# Patient Record
Sex: MALE | Race: BLACK OR AFRICAN AMERICAN | NOT HISPANIC OR LATINO | Employment: UNEMPLOYED | URBAN - METROPOLITAN AREA
[De-identification: names, ages, dates, MRNs, and addresses within clinical notes are randomized per-mention and may not be internally consistent; named-entity substitution may affect disease eponyms.]

---

## 2023-07-15 ENCOUNTER — HOSPITAL ENCOUNTER (EMERGENCY)
Facility: HOSPITAL | Age: 28
Discharge: HOME | End: 2023-07-15
Attending: EMERGENCY MEDICINE

## 2023-07-15 VITALS
WEIGHT: 160 LBS | HEART RATE: 110 BPM | RESPIRATION RATE: 18 BRPM | SYSTOLIC BLOOD PRESSURE: 144 MMHG | DIASTOLIC BLOOD PRESSURE: 91 MMHG | OXYGEN SATURATION: 100 % | BODY MASS INDEX: 24.25 KG/M2 | HEIGHT: 68 IN | TEMPERATURE: 98 F

## 2023-07-15 DIAGNOSIS — Z20.2 POSSIBLE EXPOSURE TO STD: Primary | ICD-10-CM

## 2023-07-15 DIAGNOSIS — A74.9 CHLAMYDIA: ICD-10-CM

## 2023-07-15 LAB
APPEARANCE UR: CLEAR
BACTERIA #/AREA URNS AUTO: ABNORMAL /HPF
BILIRUB UR QL STRIP.AUTO: NEGATIVE
C TRACH DNA SPEC QL NAA+PROBE: DETECTED
COLOR UR: ABNORMAL
GLUCOSE UR QL STRIP.AUTO: NEGATIVE
HIV 1+2 AB+HIV1 P24 AG SERPL QL IA: NONREACTIVE
KETONES UR QL STRIP.AUTO: ABNORMAL
LEUKOCYTE ESTERASE UR QL STRIP.AUTO: ABNORMAL
N GONORRHOEA DNA SPEC QL NAA+PROBE: NOT DETECTED
NITRITE UR QL STRIP.AUTO: NEGATIVE
PH UR STRIP.AUTO: 6.5 [PH]
PROT UR QL STRIP.AUTO: ABNORMAL
RBC #/AREA URNS AUTO: 5 /HPF
RBC UR QL AUTO: ABNORMAL
SP GR UR STRIP.AUTO: 1.02 (ref 1–1.03)
SQUAMOUS #/AREA URNS AUTO: <5 /HPF
UROBILINOGEN UR STRIP-ACNC: 1
WBC #/AREA URNS AUTO: 137 /HPF

## 2023-07-15 PROCEDURE — 63600175 PHARM REV CODE 636 W HCPCS: Performed by: NURSE PRACTITIONER

## 2023-07-15 PROCEDURE — 99284 EMERGENCY DEPT VISIT MOD MDM: CPT

## 2023-07-15 PROCEDURE — 87088 URINE BACTERIA CULTURE: CPT | Performed by: NURSE PRACTITIONER

## 2023-07-15 PROCEDURE — 81001 URINALYSIS AUTO W/SCOPE: CPT | Performed by: NURSE PRACTITIONER

## 2023-07-15 PROCEDURE — 87591 N.GONORRHOEAE DNA AMP PROB: CPT | Performed by: NURSE PRACTITIONER

## 2023-07-15 PROCEDURE — 87389 HIV-1 AG W/HIV-1&-2 AB AG IA: CPT | Performed by: NURSE PRACTITIONER

## 2023-07-15 PROCEDURE — 96372 THER/PROPH/DIAG INJ SC/IM: CPT | Performed by: NURSE PRACTITIONER

## 2023-07-15 RX ORDER — WATER FOR INJECTION,STERILE
VIAL (ML) INJECTION
Status: DISCONTINUED
Start: 2023-07-15 | End: 2023-07-15 | Stop reason: HOSPADM

## 2023-07-15 RX ORDER — DOXYCYCLINE 100 MG/1
100 CAPSULE ORAL 2 TIMES DAILY
Qty: 14 CAPSULE | Refills: 0 | Status: SHIPPED | OUTPATIENT
Start: 2023-07-15 | End: 2023-07-22

## 2023-07-15 RX ORDER — CEFTRIAXONE 1 G/1
0.5 INJECTION, POWDER, FOR SOLUTION INTRAMUSCULAR; INTRAVENOUS
Status: COMPLETED | OUTPATIENT
Start: 2023-07-15 | End: 2023-07-15

## 2023-07-15 RX ADMIN — CEFTRIAXONE SODIUM 0.5 G: 1 INJECTION, POWDER, FOR SOLUTION INTRAMUSCULAR; INTRAVENOUS at 02:07

## 2023-07-15 NOTE — ED PROVIDER NOTES
Encounter Date: 7/15/2023       History     Chief Complaint   Patient presents with    Exposure to STD     States wants to be tested for all STD's     Patient is requesting STD testing. States unprotected intercourse. Denies any dysuria, discharge, rashes, sores, lesions, or any complaints.     The history is provided by the patient.   Review of patient's allergies indicates:  No Known Allergies  No past medical history on file.  No past surgical history on file.  No family history on file.     Review of Systems   Constitutional: Negative.    HENT: Negative.     Eyes: Negative.    Respiratory: Negative.     Cardiovascular: Negative.    Gastrointestinal: Negative.    Endocrine: Negative.    Genitourinary: Negative.  Negative for dysuria, genital sores, penile discharge, penile pain, penile swelling and testicular pain.   Musculoskeletal: Negative.    Skin: Negative.    Allergic/Immunologic: Negative.    Neurological: Negative.    Hematological: Negative.    Psychiatric/Behavioral: Negative.     All other systems reviewed and are negative.    Physical Exam     Initial Vitals [07/15/23 1136]   BP Pulse Resp Temp SpO2   (!) 144/91 110 18 98.2 °F (36.8 °C) 100 %      MAP       --         Physical Exam    Constitutional: He appears well-developed and well-nourished.   HENT:   Head: Normocephalic and atraumatic.   Eyes: Conjunctivae and EOM are normal. Pupils are equal, round, and reactive to light.   Neck: Neck supple.   Normal range of motion.  Cardiovascular:  Normal rate, regular rhythm and normal heart sounds.           Pulmonary/Chest: Breath sounds normal. No respiratory distress.   Musculoskeletal:         General: Normal range of motion.      Cervical back: Normal range of motion and neck supple.     Neurological: GCS score is 15. GCS eye subscore is 4. GCS verbal subscore is 5. GCS motor subscore is 6.   Skin: Skin is warm and dry.   Psychiatric: He has a normal mood and affect. His behavior is normal. Thought  content normal.       ED Course   Procedures  Labs Reviewed   CHLAMYDIA/GONORRHOEAE(GC), PCR - Abnormal; Notable for the following components:       Result Value    Chlamydia trachomatis PCR Detected (*)     All other components within normal limits    Narrative:     The Xpert CT/NG test, performed on the GeneXpert system is a qualitative in vitro real-time polymerase chain reaction (PCR) test for the automated detected and differentiation for genomic DNA from Chlamydia trachomatis (CT) and/or Neisseria gonorrhoeae (NG).   URINALYSIS, REFLEX TO URINE CULTURE - Abnormal; Notable for the following components:    Protein, UA 2+ (*)     Ketones, UA Trace (*)     Blood, UA Trace (*)     Leukocyte Esterase, UA 2+ (*)     All other components within normal limits   URINALYSIS, MICROSCOPIC - Abnormal; Notable for the following components:    WBC,  (*)     All other components within normal limits   HIV 1 / 2 ANTIBODY - Normal   CULTURE, URINE          Imaging Results    None          Medications   sterile water for injection injection (has no administration in time range)   cefTRIAXone injection 0.5 g (0.5 g Intramuscular Given 7/15/23 1448)     Medical Decision Making:   Initial Assessment:   Patient is requesting STD testing. States unprotected intercourse. Denies any dysuria, discharge, rashes, sores, lesions, or any complaints.     The history is provided by the patient.   Patient is awake, alert, afebrile, and nontoxic appearing in the ED.  Differential Diagnosis:   Gonorrhea, Chlamydia, STD, STD Screening.   Clinical Tests:   Lab Tests: Ordered and Reviewed       <> Summary of Lab: Patient is positive for chlamydia.  ED Management:  Will treat patient for Chlamydia and have him follow-up with the Health [facility].                         Clinical Impression:   Final diagnoses:  [Z20.2] Possible exposure to STD (Primary)  [A74.9] Chlamydia        ED Disposition Condition    Discharge Stable          ED Prescriptions        Medication Sig Dispense Start Date End Date Auth. Provider    doxycycline (VIBRAMYCIN) 100 MG Cap Take 1 capsule (100 mg total) by mouth 2 (two) times daily. for 7 days 14 capsule 7/15/2023 7/22/2023 LILIAN Malloy          Follow-up Information       Follow up With Specialties Details Why Contact Info    [facility]  In 3 days      Primary Care Provider  In 3 days               LILIAN Malloy  07/15/23 3626

## 2023-07-15 NOTE — FIRST PROVIDER EVALUATION
Medical screening examination initiated.  I have conducted a focused provider triage encounter, findings are as follows:    Brief history of present illness:  Patient is requesting STD testing.     There were no vitals filed for this visit.    Pertinent physical exam:  Awake, alert, ambulatory      Brief workup plan:  Labs    Preliminary workup initiated; this workup will be continued and followed by the physician or advanced practice provider that is assigned to the patient when roomed.

## 2023-07-17 LAB — BACTERIA UR CULT: NO GROWTH

## 2024-07-24 ENCOUNTER — HOSPITAL ENCOUNTER (EMERGENCY)
Facility: HOSPITAL | Age: 29
Discharge: LEFT BEFORE DISCHARGE | End: 2024-07-24
Payer: MEDICAID

## 2024-07-24 VITALS
TEMPERATURE: 98 F | HEIGHT: 67 IN | RESPIRATION RATE: 16 BRPM | SYSTOLIC BLOOD PRESSURE: 156 MMHG | WEIGHT: 192 LBS | BODY MASS INDEX: 30.13 KG/M2 | OXYGEN SATURATION: 99 % | HEART RATE: 57 BPM | DIASTOLIC BLOOD PRESSURE: 98 MMHG

## 2024-07-24 PROCEDURE — 99281 EMR DPT VST MAYX REQ PHY/QHP: CPT

## 2024-07-24 NOTE — FIRST PROVIDER EVALUATION
"Medical screening examination initiated.  I have conducted a focused provider triage encounter, findings are as follows:    Brief history of present illness:  29 year old male presents to ER with possible STD exposure. Denies symptoms. States his girlfriend had and STD    Vitals:    07/24/24 1101   BP: (!) 156/98   Pulse: (!) 57   Resp: 16   Temp: 97.9 °F (36.6 °C)   TempSrc: Oral   SpO2: 99%   Weight: 87.1 kg (192 lb)   Height: 5' 7" (1.702 m)       Pertinent physical exam:  Awake and alert, NAD    Brief workup plan:  UA    Preliminary workup initiated; this workup will be continued and followed by the physician or advanced practice provider that is assigned to the patient when roomed.  "